# Patient Record
(demographics unavailable — no encounter records)

---

## 2018-02-03 NOTE — EMERGENCY ROOM REPORT
History of Present Illness


General


Chief Complaint:  Sore Throat


Source:  Patient, EMS





Present Illness


HPI


Is a 55-year-old female who was just discharged from [facility] for 

cellulitis to lower extremity.  She was prescribed Bactrim and Keflex.  She was 

sent to a rehabilitation facility but she left there because she didn't like it 

there.  She is now complaining of sore throat.  Onset since she left the 

hospital.  No fever chills but no nausea no vomiting.  Nothing made it better.  

Swollen made it worse.


Allergies:  


Coded Allergies:  


     No Known Allergies (Unverified , 2/3/18)





Patient History


Past Medical History:  see triage record, old chart reviewed


Past Surgical History:  none


Pertinent Family History:  none


Social History:  Denies: smoking


Pregnant Now:  No


Immunizations:  other


Reviewed Nursing Documentation:  PMH: Agreed, PSxH: Agreed





Nursing Documentation-PMH


Past Medical History:  No Stated History





Review of Systems


Eye:  Denies: eye pain, blurred vision


ENT:  Reports: throat pain, Denies: ear pain, nose congestion, throat swelling


Respiratory:  Denies: cough, shortness of breath


Cardiovascular:  Denies: chest pain, palpitations


Gastrointestinal:  Denies: abdominal pain, diarrhea, nausea, vomiting


Musculoskeletal:  Denies: back pain, joint pain


Skin:  Denies: rash


Neurological:  Denies: headache, numbness


Endocrine:  Denies: increased thirst, increased urine


Hematologic/Lymphatic:  Denies: easy bruising


All Other Systems:  negative except mentioned in HPI





Physical Exam





Vital Signs








  Date Time  Temp Pulse Resp B/P (MAP) Pulse Ox O2 Delivery O2 Flow Rate FiO2


 


2/3/18 21:15  100 16 140/92 98 Room Air  





vitals normal


Sp02 EP Interpretation:  reviewed, normal


General Appearance:  well appearing, no apparent distress, alert


Head:  normocephalic, atraumatic


Eyes:  bilateral eye PERRL, bilateral eye EOMI


ENT:  hearing grossly normal, normal pharynx


Neck:  full range of motion, supple, no meningismus


Respiratory:  chest non-tender, lungs clear, normal breath sounds


Cardiovascular #1:  regular rate, rhythm, no murmur


Gastrointestinal:  normal bowel sounds, non tender, no mass, no organomegaly, 

no bruit, non-distended


Musculoskeletal:  back normal, gait/station normal, normal range of motion, 

other - Chronic skin changes of Lower extremities.  No edema. She has a ulcer 

to the right lower leg.  No infection.


Neurologic:  alert, oriented x3


Psychiatric:  mood/affect normal


Skin:  warm/dry





Medical Decision Making


Diagnostic Impression:  


 Primary Impression:  


 Pharyngitis, acute


 Qualified Codes:  J02.8 - Acute pharyngitis due to other specified organisms


ER Course


Patient with a pharyngitis.  Most likely while in nature.  No exudates.  She 

started on antibiotics.  No trismus.  No evidence of peritonsillar abscess, 

retropharyngeal abscess or Storm angina.  No active infection.  We'll 

discharge home.





Last Vital Signs








  Date Time  Temp Pulse Resp B/P (MAP) Pulse Ox O2 Delivery O2 Flow Rate FiO2


 


2/3/18 21:15  100 16 140/92 98 Room Air  








Status:  unchanged


Disposition:  HOME, SELF-CARE


Condition:  Stable


Patient Instructions:  Sore Throat





Additional Instructions:  


Take your antibiotics.  Followup with your doctor as scheduled by [facility].  Return 

if symptom worsen.











MASTER TOMLIN M.D. Feb 3, 2018 21:35